# Patient Record
(demographics unavailable — no encounter records)

---

## 2017-04-03 NOTE — ED PHYSICIAN DOCUMENTATION
History of Present Illness





- Stated complaint


Stated Complaint: CHOKING





- Chief complaint


Chief Complaint: General





- History obtained from


History obtained from: Patient, EMS





- History of Present Illness


Timing: Today (88-year-old woman presents by ambulance for potential esophageal 

food impaction.  She has a history of a gastric volvulus.  She was eating beef 

stroganoff than feels like it stuck just below level of the clavicles.  She is 

unable to swallow it this point but is not having any problems speaking or 

breathing.)





Review of Systems


Ten Systems: 10 systems reviewed and negative


Constitutional: reports: Reviewed and negative


Throat: reports: Reviewed and negative


Cardiac: reports: Reviewed and negative





PD PAST MEDICAL HISTORY





- Past Medical History


Cardiovascular: Hypertension


Respiratory: None


Endocrine/Autoimmune: None


GI: GERD, Hiatal hernia


GYN: Breast cancer


: None


HEENT: Chronic vision loss


Psych: Anxiety


Musculoskeletal: Osteoarthritis


Derm: None





- Past Surgical History


Past Surgical History: Yes


General: Gastric surgery, Colonoscopy, EGD


HEENT: Tonsil/Adenoidectomy





- Present Medications


Home Medications: 


 Ambulatory Orders











 Medication  Instructions  Recorded  Confirmed


 


Aspirin 81 mg PO DAILY 05/15/14 10/29/15


 


Levothyroxine Sodium [Synthroid] 50 mcg PO DAILY 05/15/14 10/29/15


 


Metoprolol Succinate 25 mg PO BID 05/15/14 10/29/15


 


Valsartan [Diovan] 160 mg PO DAILY 05/15/14 10/29/15


 


Nitroglycerin 0.4 mg SL ONCE PRN #1 bottle 05/22/15 10/29/15


 


amLODIPine [Norvasc] 5 mg PO ONCE 05/22/15 10/29/15














- Allergies


Allergies/Adverse Reactions: 


 Allergies











Allergy/AdvReac Type Severity Reaction Status Date / Time


 


Iodinated Contrast Media - Allergy  Unknown Verified 04/03/17 13:07





Oral and     


 


pseudoephedrine AdvReac  Rash Verified 04/03/17 13:07














- Social History


Does the pt smoke?: Yes


Smoking Status: Current every day smoker


Does the pt drink ETOH?: No


Does the pt have substance abuse?: No





- Family History


Family history: reports: Non contributory





- Immunizations


Immunizations are current?: Yes





- POLST


Patient has POLST: No





PD ED PE NORMAL





- Vitals


Vital signs reviewed: Yes





- General


General: Alert and oriented X 3, Other (anxious, but speaking in full sentences)





- HEENT


HEENT: PERRL, EOMI





- Neck


Neck: Supple, no meningeal sign, No bony TTP





- Cardiac


Cardiac: RRR, No murmur





- Respiratory


Respiratory: No respiratory distress, Clear bilaterally





- Abdomen


Abdomen: Non tender





- Derm


Derm: Normal color, Warm and dry, No rash





- Extremities


Extremities: No edema, No calf tenderness / cord





- Neuro


Neuro: Alert and oriented X 3, Normal speech





- Psych


Psych: Normal mood, Other (anxious)





Results





- Vitals


Vitals: 


 Vital Signs - 24 hr











  04/03/17 04/03/17





  13:00 15:00


 


Temperature 36.2 C L 


 


Heart Rate 77 76


 


Respiratory 22 18





Rate  


 


Blood Pressure 177/82 H 156/88 H


 


O2 Saturation 99 98








 Oxygen











O2 Source [Without Activity]   Room air


 


O2 Source                      Room air

















PD MEDICAL DECISION MAKING





- ED course


ED course: 





88-year-old woman with esophageal food impaction.  She has a history of a 

gastric volvulus and hiatal hernia.  She was administered glucagon a little bit 

of Ativan, still unable to tolerate liquids.  The glucagon was repeated along 

with some nitroglycerin, again without resolution, still unable to tolerate 

liquids.  The operating room is closed today, call to [facility] to see if 

they can take her for endoscopy at 230 p.m.


Review of the chart shows that on 09/10/2015 she had a single and double 

contrast upper GI series showing a hiatal hernia with the proximal third of the 

stomach in the chest, there was no evidence of recurrence of the gastric 

volvulus or mechanical obstruction.  She did have evidence of GERD.


I spoke with the on-call surgeon at [facility], Dr. Perez, about 245 p.m., but 

was notified at that time that the patient had improved and now was able to 

swallow liquids pain mostly and she felt like the obstruction passed.





Departure





- Departure


Disposition: 01 Home, Self Care


Clinical Impression: 


 Esophageal obstruction due to food impaction


Condition: Stable


Record reviewed to determine appropriate education?: Yes


Instructions:  ED Foreign Body Esophageal Rslv


Comments: 


Call your doctor to arrange a follow up appointment. Make the next available 

appointment. In the interim return anytime if worse or if new symptoms develop.





Your blood pressure was elevated today on check in to the emergency department. 

This does not mean that you have hypertension, it is a common phenomenon to 

check into the emergency department and have elevated blood pressure. I 

recommend that you see your primary care physician within the week to have it 

rechecked when you're feeling better.


Discharge Date/Time: 04/03/17 15:01

## 2018-04-03 NOTE — XRAY REPORT
CHEST, TWO VIEWS:  04/03/2018

 

HISTORY:  Weak, productive cough.

 

COMPARISON:  08/14/2015

 

FINDINGS:  A hiatal hernia is present.  The heart size is normal.  The lungs 
are clear.  There 

is no pleural fluid or pneumothorax.  Surgical clips left axilla.  Degenerative 
change in the 

spine.

 

IMPRESSION:  CLEAR LUNGS.  NO ACUTE FINDINGS.

 

 

DD: 04/03/2018 15:32

TD: 04/03/2018 17:26

Job #: [id number]

MTDD